# Patient Record
Sex: MALE | Race: WHITE | Employment: OTHER | URBAN - METROPOLITAN AREA
[De-identification: names, ages, dates, MRNs, and addresses within clinical notes are randomized per-mention and may not be internally consistent; named-entity substitution may affect disease eponyms.]

---

## 2019-05-14 ENCOUNTER — INITIAL CONSULT (OUTPATIENT)
Dept: NEUROSURGERY | Age: 74
End: 2019-05-14
Payer: MEDICARE

## 2019-05-14 VITALS
WEIGHT: 150 LBS | HEART RATE: 77 BPM | SYSTOLIC BLOOD PRESSURE: 144 MMHG | BODY MASS INDEX: 24.11 KG/M2 | HEIGHT: 66 IN | DIASTOLIC BLOOD PRESSURE: 78 MMHG

## 2019-05-14 DIAGNOSIS — M54.2 NECK PAIN OF OVER 3 MONTHS DURATION: Primary | ICD-10-CM

## 2019-05-14 PROCEDURE — 99203 OFFICE O/P NEW LOW 30 MIN: CPT | Performed by: PHYSICIAN ASSISTANT

## 2019-05-14 ASSESSMENT — ENCOUNTER SYMPTOMS
ALLERGIC/IMMUNOLOGIC NEGATIVE: 1
GASTROINTESTINAL NEGATIVE: 1
RESPIRATORY NEGATIVE: 1
EYES NEGATIVE: 1

## 2019-05-14 NOTE — COMMUNICATION BODY
Assessment:     68year old male with mild intermittent neck pain and right arm numbness from the elbow to the hand. Plan:     He will bring his cervical x-rays and we will review those together. He will continue with naproxen and heat. He is refusing PT at this time.

## 2019-05-14 NOTE — PATIENT INSTRUCTIONS
Patient Education        Neck Pain: Care Instructions  Your Care Instructions    You can have neck pain anywhere from the bottom of your head to the top of your shoulders. It can spread to the upper back or arms. Injuries, painting a ceiling, sleeping with your neck twisted, staying in one position for too long, and many other activities can cause neck pain. Most neck pain gets better with home care. Your doctor may recommend medicine to relieve pain or relax your muscles. He or she may suggest exercise and physical therapy to increase flexibility and relieve stress. You may need to wear a special (cervical) collar to support your neck for a day or two. Follow-up care is a key part of your treatment and safety. Be sure to make and go to all appointments, and call your doctor if you are having problems. It's also a good idea to know your test results and keep a list of the medicines you take. How can you care for yourself at home? · Try using a heating pad on a low or medium setting for 15 to 20 minutes every 2 or 3 hours. Try a warm shower in place of one session with the heating pad. · You can also try an ice pack for 10 to 15 minutes every 2 to 3 hours. Put a thin cloth between the ice and your skin. · Take pain medicines exactly as directed. ? If the doctor gave you a prescription medicine for pain, take it as prescribed. ? If you are not taking a prescription pain medicine, ask your doctor if you can take an over-the-counter medicine. · If your doctor recommends a cervical collar, wear it exactly as directed. When should you call for help? Call your doctor now or seek immediate medical care if:    · You have new or worsening numbness in your arms, buttocks or legs.     · You have new or worsening weakness in your arms or legs.  (This could make it hard to stand up.)     · You lose control of your bladder or bowels.    Watch closely for changes in your health, and be sure to contact your doctor if:    · Your neck pain is getting worse.     · You are not getting better after 1 week.     · You do not get better as expected. Where can you learn more? Go to [email or web address] and sign in to your [employer] account. Enter [id number] in the Located within [facility] box to learn more about \"Neck Pain: Care Instructions. \"     If you do not have an account, please click on the \"Sign Up Now\" link. Current as of: September 20, 2018  Content Version: 12.0  © 8484-6842 [employer]wise, Incorporated. Care instructions adapted under license by [facility] ([facility]). If you have questions about a medical condition or this instruction, always ask your healthcare professional. [address] any warranty or liability for your use of this information.

## 2019-05-14 NOTE — PROGRESS NOTES
Subjective:      Patient ID: Yasmin Penn is a 68 y.o. male. Neck Pain    This is a chronic problem. The current episode started more than 1 year ago. The problem occurs intermittently. The problem has been unchanged. The pain is present in the midline. The quality of the pain is described as aching. The pain is mild. Associated symptoms comments: Right arm numbness from elbow to thumb. Treatments tried: naproxen. The treatment provided mild relief. Review of Systems   Constitutional: Negative. HENT: Negative. Eyes: Negative. Respiratory: Negative. Cardiovascular: Negative. Gastrointestinal: Negative. Endocrine: Negative. Genitourinary: Negative. Musculoskeletal: Positive for neck pain. Skin: Negative. Allergic/Immunologic: Negative. Neurological: Negative. Hematological: Negative. Psychiatric/Behavioral: Negative. Objective:   Physical Exam   Constitutional: He is oriented to person, place, and time. HENT:   Head: Normocephalic and atraumatic. Eyes: Pupils are equal, round, and reactive to light. EOM are normal.   Neck: Normal range of motion. Neck supple. Pulmonary/Chest: No respiratory distress. Abdominal: He exhibits no distension. Musculoskeletal: Normal range of motion. He exhibits no tenderness. Neurological: He is alert and oriented to person, place, and time. He has normal strength and normal reflexes. He is not disoriented. No cranial nerve deficit or sensory deficit. Gait normal. GCS eye subscore is 4. GCS verbal subscore is 5. GCS motor subscore is 6. Skin: Skin is warm and dry. Psychiatric: He has a normal mood and affect. Assessment:      68year old male with mild intermittent neck pain and right arm numbness from the elbow to the hand. Plan:      He will bring his cervical x-rays and we will review those together. He will continue with naproxen and heat. He is refusing PT at this time.         MEKA Gilman

## 2019-05-14 NOTE — LETTER
[address] [facility] [id number]  Phone: [phone]  Fax: [phone]    No ref. provider found        May 14, 2019       Patient: Sascha Seymour   MR Number: <[id number]>   YOB: 1945   Date of Visit: 5/14/2019       Dear Dr. Gail Guerrero ref. provider found: Thank you for the request for consultation for Sascha Seymour to me for the evaluation of neck pain. Below are the relevant portions of my assessment and plan of care. Assessment:     68year old male with mild intermittent neck pain and right arm numbness from the elbow to the hand. Plan:     He will bring his cervical x-rays and we will review those together. He will continue with naproxen and heat. He is refusing PT at this time. If you have questions, please do not hesitate to call me. I look forward to following Allie Mcdaniel along with you.     Sincerely,        MEKA Whaley    [facility] providers:  Jasmin Sotomayor MD  [address] [place] [address] [phone]  VIA In Basket

## 2019-05-21 ENCOUNTER — OFFICE VISIT (OUTPATIENT)
Dept: NEUROSURGERY | Age: 74
End: 2019-05-21
Payer: MEDICARE

## 2019-05-21 VITALS
HEIGHT: 66 IN | WEIGHT: 153 LBS | BODY MASS INDEX: 24.59 KG/M2 | HEART RATE: 57 BPM | DIASTOLIC BLOOD PRESSURE: 83 MMHG | SYSTOLIC BLOOD PRESSURE: 152 MMHG

## 2019-05-21 DIAGNOSIS — M47.22 OSTEOARTHRITIS OF SPINE WITH RADICULOPATHY, CERVICAL REGION: Primary | ICD-10-CM

## 2019-05-21 PROCEDURE — 99213 OFFICE O/P EST LOW 20 MIN: CPT | Performed by: PHYSICIAN ASSISTANT

## 2019-05-21 RX ORDER — GABAPENTIN 300 MG/1
300 CAPSULE ORAL 3 TIMES DAILY
Qty: 90 CAPSULE | Refills: 3 | Status: SHIPPED | OUTPATIENT
Start: 2019-05-21 | End: 2019-06-20

## 2019-05-21 RX ORDER — NAPROXEN 375 MG/1
375 TABLET ORAL 2 TIMES DAILY WITH MEALS
COMMUNITY

## 2019-05-21 ASSESSMENT — ENCOUNTER SYMPTOMS
EYES NEGATIVE: 1
RESPIRATORY NEGATIVE: 1
GASTROINTESTINAL NEGATIVE: 1
ALLERGIC/IMMUNOLOGIC NEGATIVE: 1

## 2019-05-21 NOTE — PROGRESS NOTES
Subjective:      Patient ID: Yasmin Penn is a 68 y.o. male. Neck Pain    This is a chronic problem. The current episode started more than 1 year ago. The problem occurs intermittently. The problem has been unchanged. The pain is present in the midline. The quality of the pain is described as aching. The pain is mild. Associated symptoms comments: Right arm numbness from elbow to thumb. Treatments tried: naproxen. The treatment provided mild relief. Arm Pain          Review of Systems   Constitutional: Negative. HENT: Negative. Eyes: Negative. Respiratory: Negative. Cardiovascular: Negative. Gastrointestinal: Negative. Endocrine: Negative. Genitourinary: Negative. Musculoskeletal: Positive for neck pain. Skin: Negative. Allergic/Immunologic: Negative. Neurological: Negative. Hematological: Negative. Psychiatric/Behavioral: Negative. Objective:   Physical Exam   Constitutional: He is oriented to person, place, and time. HENT:   Head: Normocephalic and atraumatic. Eyes: Pupils are equal, round, and reactive to light. EOM are normal.   Neck: Normal range of motion. Neck supple. Pulmonary/Chest: No respiratory distress. Abdominal: He exhibits no distension. Musculoskeletal: Normal range of motion. He exhibits no tenderness. Neurological: He is alert and oriented to person, place, and time. He has normal strength and normal reflexes. He is not disoriented. No cranial nerve deficit or sensory deficit. Gait normal. GCS eye subscore is 4. GCS verbal subscore is 5. GCS motor subscore is 6. Skin: Skin is warm and dry. Psychiatric: He has a normal mood and affect. Assessment:      68year old male with mild intermittent neck pain and right arm numbness from the elbow to the hand. Cervical x-rays show C4-7 DDD and spondylosis, most severe at C5-6. Normal lordosis. EMG from years ago demonstrated a right C6 radiculopathy.       Plan:      Gabapentin and PT will be started. He will continue with naproxen and heat. Should conservative measures fail, we will get a cervical MRI.         MEKA Edwards

## 2019-05-21 NOTE — COMMUNICATION BODY
Assessment:     68year old male with mild intermittent neck pain and right arm numbness from the elbow to the hand. Cervical x-rays show C4-7 DDD and spondylosis, most severe at C5-6. Normal lordosis. EMG from years ago demonstrated a right C6 radiculopathy. Plan:     Gabapentin and PT will be started. He will continue with naproxen and heat. Should conservative measures fail, we will get a cervical MRI.

## 2019-07-18 ENCOUNTER — TELEPHONE (OUTPATIENT)
Dept: NEUROSURGERY | Age: 74
End: 2019-07-18